# Patient Record
Sex: FEMALE | Race: WHITE | NOT HISPANIC OR LATINO | URBAN - METROPOLITAN AREA
[De-identification: names, ages, dates, MRNs, and addresses within clinical notes are randomized per-mention and may not be internally consistent; named-entity substitution may affect disease eponyms.]

---

## 2024-02-03 ENCOUNTER — OFFICE VISIT (OUTPATIENT)
Dept: URGENT CARE | Facility: CLINIC | Age: 64
End: 2024-02-03
Payer: COMMERCIAL

## 2024-02-03 VITALS
RESPIRATION RATE: 14 BRPM | OXYGEN SATURATION: 98 % | DIASTOLIC BLOOD PRESSURE: 87 MMHG | HEART RATE: 58 BPM | BODY MASS INDEX: 26.07 KG/M2 | SYSTOLIC BLOOD PRESSURE: 160 MMHG | TEMPERATURE: 97.9 F | WEIGHT: 138 LBS

## 2024-02-03 DIAGNOSIS — R42 VERTIGO: ICD-10-CM

## 2024-02-03 DIAGNOSIS — J06.9 ACUTE URI: Primary | ICD-10-CM

## 2024-02-03 PROCEDURE — 1036F TOBACCO NON-USER: CPT | Performed by: PHYSICIAN ASSISTANT

## 2024-02-03 PROCEDURE — 1125F AMNT PAIN NOTED PAIN PRSNT: CPT | Performed by: PHYSICIAN ASSISTANT

## 2024-02-03 PROCEDURE — 99204 OFFICE O/P NEW MOD 45 MIN: CPT | Performed by: PHYSICIAN ASSISTANT

## 2024-02-03 RX ORDER — METHYLPREDNISOLONE 4 MG/1
TABLET ORAL
Qty: 21 TABLET | Refills: 0 | Status: SHIPPED | OUTPATIENT
Start: 2024-02-03

## 2024-02-03 RX ORDER — MECLIZINE HYDROCHLORIDE 25 MG/1
25 TABLET ORAL 3 TIMES DAILY PRN
Qty: 30 TABLET | Refills: 0 | Status: SHIPPED | OUTPATIENT
Start: 2024-02-03 | End: 2025-02-02

## 2024-02-03 ASSESSMENT — ENCOUNTER SYMPTOMS
FEVER: 0
BACK PAIN: 0
BLOOD IN STOOL: 0
EYE REDNESS: 0
EYE DISCHARGE: 0
HEADACHES: 0
DIZZINESS: 1
SORE THROAT: 0
NAUSEA: 0
ENDOCRINE NEGATIVE: 1
DIARRHEA: 0
SHORTNESS OF BREATH: 0
APPETITE CHANGE: 0
SINUS PRESSURE: 0
ACTIVITY CHANGE: 0
FATIGUE: 0
PSYCHIATRIC NEGATIVE: 1
WOUND: 0
ARTHRALGIAS: 0
COLOR CHANGE: 0
ABDOMINAL PAIN: 0
HEMATOLOGIC/LYMPHATIC NEGATIVE: 1
ALLERGIC/IMMUNOLOGIC NEGATIVE: 1
WHEEZING: 0
VOMITING: 0
EYE PAIN: 0
COUGH: 0
RHINORRHEA: 0

## 2024-02-03 ASSESSMENT — PAIN SCALES - GENERAL: PAINLEVEL: 8

## 2024-02-03 NOTE — PROGRESS NOTES
Subjective   Patient ID: Su Choi is a 63 y.o. female who presents for Earache and Dizziness.  Patient is here with 2 days of right-sided earache and sensation of dizziness especially with leaning over or turning her head quickly.  She notes that if she is standing still or walking straight or sitting she has no exacerbation of the dizziness.  She denies any chest pain or shortness of breath or headache associated with the symptoms.  Denies any vomiting but does note that yesterday when she leaned over she had sudden onset of nausea associated with the dizziness symptoms.  She describes as a room spinning vertigo like feeling.  She denies any fevers or chills denies any blurry vision or double vision    Past Medical History:   Diagnosis Date    Other specified health status 08/10/2022    Known health problems: none       Review of Systems   Constitutional:  Negative for activity change, appetite change, fatigue and fever.   HENT:  Positive for ear pain. Negative for congestion, rhinorrhea, sinus pressure and sore throat.    Eyes:  Negative for pain, discharge and redness.   Respiratory:  Negative for cough, shortness of breath and wheezing.    Cardiovascular:  Negative for chest pain and leg swelling.   Gastrointestinal:  Negative for abdominal pain, blood in stool, diarrhea, nausea and vomiting.   Endocrine: Negative.    Musculoskeletal:  Negative for arthralgias, back pain and gait problem.   Skin:  Negative for color change, rash and wound.   Allergic/Immunologic: Negative.    Neurological:  Positive for dizziness. Negative for headaches.   Hematological: Negative.    Psychiatric/Behavioral: Negative.         Objective   /87   Pulse 58   Temp 36.6 °C (97.9 °F)   Resp 14   Wt 62.6 kg (138 lb)   SpO2 98%   BMI 26.07 kg/m²   Physical Exam  Constitutional:       General: She is not in acute distress.     Appearance: Normal appearance. She is not ill-appearing, toxic-appearing or diaphoretic.    HENT:      Head: Normocephalic and atraumatic.      Ears:      Comments: Right-sided TM mildly injected with serous fluid level behind it, left-sided TM without any injection but does have a small serous fluid level as well.  Bilateral canals normal limits.  No tenderness to the mastoids or tragus bilaterally     Mouth/Throat:      Mouth: Mucous membranes are moist.      Pharynx: Oropharynx is clear.   Eyes:      Conjunctiva/sclera: Conjunctivae normal.   Cardiovascular:      Rate and Rhythm: Normal rate and regular rhythm.      Heart sounds: No murmur heard.  Pulmonary:      Effort: Pulmonary effort is normal. No respiratory distress.      Breath sounds: Normal breath sounds. No wheezing.   Musculoskeletal:         General: No swelling, tenderness, deformity or signs of injury. Normal range of motion.      Cervical back: Normal range of motion and neck supple.   Skin:     General: Skin is warm and dry.      Findings: No erythema or rash.   Neurological:      General: No focal deficit present.      Mental Status: She is alert and oriented to person, place, and time.      Cranial Nerves: No cranial nerve deficit.      Motor: No weakness.      Coordination: Coordination normal.      Gait: Gait normal.         Assessment/Plan   Problem List Items Addressed This Visit       Acute URI - Primary    Vertigo    Relevant Medications    methylPREDNISolone (Medrol Dospak) 4 mg tablets    meclizine (Antivert) 25 mg tablet   -Patient's history raises high suspicion for likely vestibular neuronitis secondary to viral upper respiratory infection less likely benign paroxysmal positional vertigo, I have no elevation of suspicion for central etiology such as posterior circulation stroke based upon patient appearance and physical exam and history  -Treating patient with methylprednisolone pack as well as meclizine for the symptoms recommending if there is any significant worsening despite treatment patient should be evaluated at the  emergency room

## 2024-02-03 NOTE — PATIENT INSTRUCTIONS
Assessment/Plan   Problem List Items Addressed This Visit       Acute URI - Primary    Vertigo    Relevant Medications    methylPREDNISolone (Medrol Dospak) 4 mg tablets    meclizine (Antivert) 25 mg tablet   -Patient's history raises high suspicion for likely vestibular neuronitis secondary to viral upper respiratory infection less likely benign paroxysmal positional vertigo, I have no elevation of suspicion for central etiology such as posterior circulation stroke based upon patient appearance and physical exam and history  -Treating patient with methylprednisolone pack as well as meclizine for the symptoms recommending if there is any significant worsening despite treatment patient should be evaluated at the emergency room

## 2025-04-07 LAB
NON-UH HIE A/G RATIO: 1.2
NON-UH HIE ALB: 4.1 G/DL (ref 3.4–5)
NON-UH HIE ALK PHOS: 78 UNIT/L (ref 45–117)
NON-UH HIE APPEARANCE, U: ABNORMAL
NON-UH HIE BASO COUNT: 0.01 X1000 (ref 0–0.2)
NON-UH HIE BASOS %: 0.2 %
NON-UH HIE BILIRUBIN, TOTAL: 0.4 MG/DL (ref 0.3–1.2)
NON-UH HIE BILIRUBIN, U: ABNORMAL
NON-UH HIE BLOOD, U: ABNORMAL
NON-UH HIE BUN/CREAT RATIO: 17
NON-UH HIE BUN: 17 MG/DL (ref 9–23)
NON-UH HIE CALCIUM: 10.3 MG/DL (ref 8.7–10.4)
NON-UH HIE CALCULATED OSMOLALITY: 287 MOSM/KG (ref 275–295)
NON-UH HIE CHLORIDE: 105 MMOL/L (ref 98–107)
NON-UH HIE CO2, VENOUS: 28 MMOL/L (ref 20–31)
NON-UH HIE COLOR, U: ABNORMAL
NON-UH HIE CREATININE: 1 MG/DL (ref 0.5–0.8)
NON-UH HIE DIFF?: ABNORMAL
NON-UH HIE EOS COUNT: 0.04 X1000 (ref 0–0.5)
NON-UH HIE EOSIN %: 0.8 %
NON-UH HIE GFR AA: >60
NON-UH HIE GLOBULIN: 3.3 G/DL
NON-UH HIE GLOMERULAR FILTRATION RATE: 55 ML/MIN/1.73M?
NON-UH HIE GLUCOSE QUAL, U: ABNORMAL
NON-UH HIE GLUCOSE: 130 MG/DL (ref 74–106)
NON-UH HIE GOT: 24 UNIT/L (ref 15–37)
NON-UH HIE GPT: 26 UNIT/L (ref 10–49)
NON-UH HIE HCT: 44.6 % (ref 36–46)
NON-UH HIE HGB: 15.2 G/DL (ref 12–16)
NON-UH HIE INSTR WBC: 5.7
NON-UH HIE K: 4.2 MMOL/L (ref 3.5–5.1)
NON-UH HIE KETONES, U: ABNORMAL
NON-UH HIE LEUKOCYTE ESTERASE, U: ABNORMAL
NON-UH HIE LYMPH %: 32.1 %
NON-UH HIE LYMPH COUNT: 1.84 X1000 (ref 1.2–4.8)
NON-UH HIE MCH: 32.9 PG (ref 27–34)
NON-UH HIE MCHC: 34.2 G/DL (ref 32–37)
NON-UH HIE MCV: 96.2 FL (ref 80–100)
NON-UH HIE MONO %: 8.1 %
NON-UH HIE MONO COUNT: 0.46 X1000 (ref 0.1–1)
NON-UH HIE MPV: 7.3 FL (ref 7.4–10.4)
NON-UH HIE MUCOUS, U: ABNORMAL
NON-UH HIE NA: 142 MMOL/L (ref 135–145)
NON-UH HIE NEUTROPHIL %: 58.8 %
NON-UH HIE NEUTROPHIL COUNT (ANC): 3.37 X1000 (ref 1.4–8.8)
NON-UH HIE NITRITE, U: ABNORMAL
NON-UH HIE NUCLEATED RBC: 0 /100WBC
NON-UH HIE PH, U: ABNORMAL (ref 4.5–8)
NON-UH HIE PLATELET: 382 X10 (ref 150–450)
NON-UH HIE PROTEIN, U: ABNORMAL
NON-UH HIE RBC/HPF, U: <1 #/HPF (ref 0–3)
NON-UH HIE RBC: 4.63 X10 (ref 4.2–5.4)
NON-UH HIE RDW: 13.1 % (ref 11.5–14.5)
NON-UH HIE SPECIFIC GRAVITY, U: ABNORMAL (ref 1–1.03)
NON-UH HIE SQUAMOUS EPITHELIAL CELLS, U: 1 #/HPF
NON-UH HIE TOTAL PROTEIN: 7.4 G/DL (ref 5.7–8.2)
NON-UH HIE U MICRO: ABNORMAL
NON-UH HIE UROBILINOGEN QUAL, U: ABNORMAL
NON-UH HIE WBC/HPF, U: <1 #/HPF (ref 0–5)
NON-UH HIE WBC: 5.7 X10 (ref 4.5–11)

## 2025-06-02 ENCOUNTER — APPOINTMENT (OUTPATIENT)
Dept: NEUROSURGERY | Facility: CLINIC | Age: 65
End: 2025-06-02
Payer: COMMERCIAL

## 2025-06-02 VITALS
HEIGHT: 61 IN | SYSTOLIC BLOOD PRESSURE: 162 MMHG | HEART RATE: 91 BPM | WEIGHT: 148.4 LBS | TEMPERATURE: 97.7 F | BODY MASS INDEX: 28.02 KG/M2 | DIASTOLIC BLOOD PRESSURE: 88 MMHG

## 2025-06-02 DIAGNOSIS — M47.12 CERVICAL SPONDYLOSIS WITH MYELOPATHY: Primary | ICD-10-CM

## 2025-06-02 DIAGNOSIS — M54.12 CERVICAL RADICULOPATHY: ICD-10-CM

## 2025-06-02 PROCEDURE — 1159F MED LIST DOCD IN RCRD: CPT | Performed by: NURSE PRACTITIONER

## 2025-06-02 PROCEDURE — 1160F RVW MEDS BY RX/DR IN RCRD: CPT | Performed by: NURSE PRACTITIONER

## 2025-06-02 PROCEDURE — 3008F BODY MASS INDEX DOCD: CPT | Performed by: NURSE PRACTITIONER

## 2025-06-02 PROCEDURE — 99204 OFFICE O/P NEW MOD 45 MIN: CPT | Performed by: NURSE PRACTITIONER

## 2025-06-02 PROCEDURE — 1125F AMNT PAIN NOTED PAIN PRSNT: CPT | Performed by: NURSE PRACTITIONER

## 2025-06-02 PROCEDURE — 1036F TOBACCO NON-USER: CPT | Performed by: NURSE PRACTITIONER

## 2025-06-02 RX ORDER — PANTOPRAZOLE SODIUM 40 MG/1
40 TABLET, DELAYED RELEASE ORAL DAILY
COMMUNITY

## 2025-06-02 RX ORDER — PREDNISONE 20 MG/1
20 TABLET ORAL DAILY
COMMUNITY
Start: 2025-05-22

## 2025-06-02 ASSESSMENT — PATIENT HEALTH QUESTIONNAIRE - PHQ9
2. FEELING DOWN, DEPRESSED OR HOPELESS: NOT AT ALL
SUM OF ALL RESPONSES TO PHQ9 QUESTIONS 1 & 2: 0
1. LITTLE INTEREST OR PLEASURE IN DOING THINGS: NOT AT ALL

## 2025-06-02 ASSESSMENT — PAIN SCALES - GENERAL: PAINLEVEL_OUTOF10: 6
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
## 2025-06-26 ENCOUNTER — TELEPHONE (OUTPATIENT)
Dept: NEUROLOGY | Facility: CLINIC | Age: 65
End: 2025-06-26
Payer: COMMERCIAL

## 2025-07-02 ENCOUNTER — TELEPHONE (OUTPATIENT)
Dept: NEUROSURGERY | Facility: CLINIC | Age: 65
End: 2025-07-02
Payer: COMMERCIAL

## 2025-07-02 NOTE — TELEPHONE ENCOUNTER
Result Communication    Resulted Orders   MR cervical spine wo IV contrast    Narrative    MR CERVICAL SPINE WITHOUT IV CONTRAST  CLINICAL STATEMENT:  M47.12, M54.12;OTHER REASON.   TECHNOLOGIST NOTES: WHAT SYMPTOMS ARE YOU EXPERIENCING?; neck pain, left hand tingling, right shoulder sore x4 years    TECHNIQUE: Multiplanar, multisequence imaging of the cervical spine.   COMPARISON: Cervical spine radiograph.  FINDINGS:   The cervical spine demonstrates normal alignment retrolisthesis of C5 on C6 anterolisthesis C4 on C5.. Vertebral bodies are normal in height. There is a normal marrow signal pattern. Intervertebral discs are normal in height and signal intensity.   The cervical spinal cord is normal in size, contour and signal intensity. The paraspinal soft tissues, craniocervical junction and included intracranial contents are grossly normal.  C2-3: No significant disc bulge, no canal stenosis or significant neuroforaminal narrowing.  C3-4:  Mild posterior spur disc complex with mild uncovertebral joint hypertrophy. No canal stenosis, mild bilateral neural foraminal narrowing.  C4-5:  Moderate posterior spur disc complex effacing the ventral thecal sac and causing minimal canal stenosis, there is left greater than right facet and uncovertebral hypertrophy causing mild to moderate left and mild right neural foraminal narrowing.  C5-6:  Moderate posterior spur disc complex effacing the ventral thecal sac and causing minimal canal stenosis. There is moderate facet and uncovertebral joint hypertrophy causing moderate bilateral neural foraminal narrowing.  C6-7: Mild posterior spur disc complex. No canal stenosis or significant neural foraminal narrowing.  C7-T1: Minimal posterior spur disc complex. No canal stenosis, or neuroforaminal narrowing.  IMPRESSION:   1.  Multilevel degenerative disc disease with minimal canal stenosis at C4-C5 and C5-C6.  2.  Multilevel neural foraminal narrowing, most pronounced at C5-C6  where there is moderate bilateral neural foraminal narrowing.  3.  Spondylolisthesis, as above.  Electronically signed by:  Eber Kee MD  06/27/2025 04:55 PM EDT  Workstation: [id number]  Technologist:  SHIRA CHAND MB  Dictated By:   EBER KEE MD  Signed By:     EBER KEE MD  Signed Out:    06/27/25 16:55:26       1:34 PM      Results were successfully communicated with the patient and they acknowledged their understanding.    Called reviewed cervical MRI and cervical x-ray from 06/17/2025 at [facility] are pushed to PACS.  Discussed with patient that she has stability with flexion and extension however there is some significant arthritic changes including some anterolisthesis of C4 on 5 and retrolisthesis of C5 on C6.  This area corresponds with her symptoms.  We discussed that this is producing moderate left-sided foraminal stenosis which is most likely the cause of her symptoms.  She has an appointment scheduled with the APRN and Dr. Tang Garland and Dr. Alcala's office with [facility] pain management group on 07/11/2025.  At that appointment she will discuss possibility of epidural spinal injections to help alleviate her symptoms.  We also discussed that the report for the MRI states that she has mild central canal stenosis at C5-6 but I would put this as mild to moderate but there is no cord signal change and there is still cerebral spinal fluid visible around the cord.  She will contact the office if she were to develop any worsening myelopathic symptoms.  I reviewed red flag symptoms with the patient as well as how when to seek emergency medical care.  She will contact the office in the meantime with any questions or concerns.    Samantha Meeson, MSN, NP-C  Adult-Gerontology Associate Nurse Practitioner  Department of Neurosurgery- Spine [place]  Main phone [phone]  Fax [phone]